# Patient Record
Sex: FEMALE | Race: WHITE | NOT HISPANIC OR LATINO | RURAL
[De-identification: names, ages, dates, MRNs, and addresses within clinical notes are randomized per-mention and may not be internally consistent; named-entity substitution may affect disease eponyms.]

---

## 2017-01-11 ENCOUNTER — OFFICE VISIT (OUTPATIENT)
Dept: RETAIL CLINIC | Facility: CLINIC | Age: 27
End: 2017-01-11

## 2017-01-11 DIAGNOSIS — Z23 NEED FOR VACCINATION: Primary | ICD-10-CM

## 2017-01-11 NOTE — PROGRESS NOTES
Presents to clinic for influenza vaccination.   Denies:   -Severe allergic reaction (e.g., anaphylaxis) after previous dose of any influenza vaccine; or to a vaccine component, including egg protein  - Moderate or severe acute illness with or without fever  - History of Guillain-Barré Syndrome within 6 weeks of previous influenza vaccination  - Tolerated well  - See scanned vaxcare document in system  - CDC vaccine information statement provided

## 2017-01-26 ENCOUNTER — OFFICE VISIT (OUTPATIENT)
Dept: RETAIL CLINIC | Facility: CLINIC | Age: 27
End: 2017-01-26

## 2017-01-26 VITALS
HEART RATE: 100 BPM | WEIGHT: 204 LBS | BODY MASS INDEX: 33.99 KG/M2 | OXYGEN SATURATION: 98 % | TEMPERATURE: 99 F | RESPIRATION RATE: 20 BRPM | HEIGHT: 65 IN

## 2017-01-26 DIAGNOSIS — J01.40 ACUTE PANSINUSITIS, RECURRENCE NOT SPECIFIED: Primary | ICD-10-CM

## 2017-01-26 DIAGNOSIS — J30.2 SEASONAL ALLERGIC RHINITIS, UNSPECIFIED ALLERGIC RHINITIS TRIGGER: ICD-10-CM

## 2017-01-26 PROCEDURE — 99213 OFFICE O/P EST LOW 20 MIN: CPT | Performed by: NURSE PRACTITIONER

## 2017-01-26 RX ORDER — FLUTICASONE PROPIONATE 50 MCG
2 SPRAY, SUSPENSION (ML) NASAL NIGHTLY
Qty: 1 EACH | Refills: 5 | Status: SHIPPED | OUTPATIENT
Start: 2017-01-26 | End: 2017-02-25

## 2017-01-26 RX ORDER — PSEUDOEPHEDRINE HCL 120 MG/1
120 TABLET, FILM COATED, EXTENDED RELEASE ORAL EVERY 12 HOURS
Qty: 20 TABLET | Refills: 0 | Status: SHIPPED | OUTPATIENT
Start: 2017-01-26 | End: 2017-02-05

## 2017-01-26 RX ORDER — SULFAMETHOXAZOLE AND TRIMETHOPRIM 800; 160 MG/1; MG/1
1 TABLET ORAL 2 TIMES DAILY
Qty: 20 TABLET | Refills: 0 | Status: SHIPPED | OUTPATIENT
Start: 2017-01-26 | End: 2017-02-05

## 2017-01-26 NOTE — MR AVS SNAPSHOT
Molly Blackwell   1/26/2017 4:15 PM   Office Visit    Dept Phone:  [phone]   Encounter #:  [id number]    Provider:  SYLVAIN MERCADO   Department:  [facility]                Your Full Care Plan              Today's Medication Changes          These changes are accurate as of: 1/26/17  4:23 PM.  If you have any questions, ask your nurse or doctor.               New Medication(s)Ordered:     fluticasone 50 MCG/ACT nasal spray   Commonly known as:  FLONASE   2 sprays into each nostril Every Night for 30 days. Administer 2 sprays in each nostril for each dose.       pseudoephedrine 120 MG 12 hr tablet   Commonly known as:  SUDAFED   Take 1 tablet by mouth Every 12 (Twelve) Hours for 10 days.       sulfamethoxazole-trimethoprim 800-160 MG per tablet   Commonly known as:  BACTRIM DS   Take 1 tablet by mouth 2 (Two) Times a Day for 10 days.         Stop taking medication(s)listed here:     predniSONE 10 MG tablet   Commonly known as:  DELTASONE                Where to Get Your Medications      These medications were sent to [facility]pharmacy #[id number] - [place] - [facility] [address] AT NEXT TO [facility] - [phone]  - [phone] [facility] [address] [facility] [address]     Phone:  [phone]     fluticasone 50 MCG/ACT nasal spray    pseudoephedrine 120 MG 12 hr tablet    sulfamethoxazole-trimethoprim 800-160 MG per tablet                  Your Updated Medication List          This list is accurate as of: 1/26/17  4:23 PM.  Always use your most recent med list.                fluticasone 50 MCG/ACT nasal spray   Commonly known as:  FLONASE   2 sprays into each nostril Every Night for 30 days. Administer 2 sprays in each nostril for each dose.       pseudoephedrine 120 MG 12 hr tablet   Commonly known as:  SUDAFED   Take 1 tablet by mouth Every 12 (Twelve) Hours for 10 days.       sulfamethoxazole-trimethoprim 800-160 MG per tablet   Commonly known as:  BACTRIM DS   Take 1  tablet by mouth 2 (Two) Times a Day for 10 days.               You Were Diagnosed With        Codes Comments    Acute pansinusitis, recurrence not specified    -  Primary ICD-10-CM: J01.40  ICD-9-CM: 461.8     Seasonal allergic rhinitis, unspecified allergic rhinitis trigger     ICD-10-CM: J30.2  ICD-9-CM: 477.9       Instructions    Sinusitis, Adult  Sinusitis is redness, soreness, and inflammation of the paranasal sinuses. Paranasal sinuses are air pockets within the bones of your face. They are located beneath your eyes, in the middle of your forehead, and above your eyes. In healthy paranasal sinuses, mucus is able to drain out, and air is able to circulate through them by way of your nose. However, when your paranasal sinuses are inflamed, mucus and air can become trapped. This can allow bacteria and other germs to grow and cause infection.  Sinusitis can develop quickly and last only a short time (acute) or continue over a long period (chronic). Sinusitis that lasts for more than 12 weeks is considered chronic.  CAUSES  Causes of sinusitis include:  · Allergies.  · Structural abnormalities, such as displacement of the cartilage that separates your nostrils (deviated septum), which can decrease the air flow through your nose and sinuses and affect sinus drainage.  · Functional abnormalities, such as when the small hairs (cilia) that line your sinuses and help remove mucus do not work properly or are not present.  SIGNS AND SYMPTOMS  Symptoms of acute and chronic sinusitis are the same. The primary symptoms are pain and pressure around the affected sinuses. Other symptoms include:  · Upper toothache.  · Earache.  · Headache.  · Bad breath.  · Decreased sense of smell and taste.  · A cough, which worsens when you are lying flat.  · Fatigue.  · Fever.  · Thick drainage from your nose, which often is green and may contain pus (purulent).  · Swelling and warmth over the affected sinuses.  DIAGNOSIS  Your health care  provider will perform a physical exam. During your exam, your health care provider may perform any of the following to help determine if you have acute sinusitis or chronic sinusitis:  · Look in your nose for signs of abnormal growths in your nostrils (nasal polyps).  · Tap over the affected sinus to check for signs of infection.  · View the inside of your sinuses using an imaging device that has a light attached (endoscope).  If your health care provider suspects that you have chronic sinusitis, one or more of the following tests may be recommended:  · Allergy tests.  · Nasal culture. A sample of mucus is taken from your nose, sent to a lab, and screened for bacteria.  · Nasal cytology. A sample of mucus is taken from your nose and examined by your health care provider to determine if your sinusitis is related to an allergy.  TREATMENT  Most cases of acute sinusitis are related to a viral infection and will resolve on their own within 10 days. Sometimes, medicines are prescribed to help relieve symptoms of both acute and chronic sinusitis. These may include pain medicines, decongestants, nasal steroid sprays, or saline sprays.  However, for sinusitis related to a bacterial infection, your health care provider will prescribe antibiotic medicines. These are medicines that will help kill the bacteria causing the infection.  Rarely, sinusitis is caused by a fungal infection. In these cases, your health care provider will prescribe antifungal medicine.  For some cases of chronic sinusitis, surgery is needed. Generally, these are cases in which sinusitis recurs more than 3 times per year, despite other treatments.  HOME CARE INSTRUCTIONS  · Drink plenty of water. Water helps thin the mucus so your sinuses can drain more easily.  · Use a humidifier.  · Inhale steam 3-4 times a day (for example, sit in the bathroom with the shower running).  · Apply a warm, moist washcloth to your face 3-4 times a day, or as directed by  your health care provider.  · Use saline nasal sprays to help moisten and clean your sinuses.  · Take medicines only as directed by your health care provider.  · If you were prescribed either an antibiotic or antifungal medicine, finish it all even if you start to feel better.  SEEK IMMEDIATE MEDICAL CARE IF:  · You have increasing pain or severe headaches.  · You have nausea, vomiting, or drowsiness.  · You have swelling around your face.  · You have vision problems.  · You have a stiff neck.  · You have difficulty breathing.     This information is not intended to replace advice given to you by your health care provider. Make sure you discuss any questions you have with your health care provider.     Document Released: 2006 Document Revised: 2016 Document Reviewed: 2013  [employer] Interactive Patient Education © [employer]       Patient Instructions History      Upcoming Appointments     Visit Type Date Time Department    OFFICE VISIT 2017  4:15 PM [facility] [place]      [facility] Signup     [facility] allows you to send messages to your doctor, view your test results, renew your prescriptions, schedule appointments, and more. To sign up, go to [employer] and click on the Sign Up Now link in the New User? box. Enter your [facility] Activation Code exactly as it appears below along with the last four digits of your Social Security Number and your Date of Birth () to complete the sign-up process. If you do not sign up before the expiration date, you must request a new code.    [facility] Activation Code: [email or web address]  Expires: 2017  4:23 PM    If you have questions, you can email [email or web address] or call [phone] to talk to our [facility] staff. Remember, [facility] is NOT to be used for urgent needs. For medical emergencies, dial 911.               Other Info from Your Visit           Allergies     Erythromycin      Penicillins        Reason for  "Visit     Sinus Problem           Vital Signs     Pulse Temperature Respirations Height Weight Last Menstrual Period    100 99 °F (37.2 °C) 20 65\" (165.1 cm) 204 lb (92.5 kg) 01/20/2017    Oxygen Saturation Body Mass Index Smoking Status             98% 33.95 kg/m2 Never Smoker         Problems and Diagnoses Noted     Acute sinus infection    -  Primary    Seasonal allergic reaction            "

## 2017-01-26 NOTE — PROGRESS NOTES
"Subjective   Molly Blackwell is a 26 y.o. female.     Sinus Problem   This is a new problem. The current episode started in the past 7 days. The problem has been gradually worsening since onset. There has been no fever. The pain is moderate. Associated symptoms include congestion, headaches (severe) and sinus pressure (severe). Pertinent negatives include no chills, coughing, ear pain, hoarse voice, shortness of breath, sneezing, sore throat or swollen glands. Treatments tried: allergy medications. The treatment provided no relief.        The following portions of the patient's history were reviewed and updated as appropriate: allergies, current medications, past family history, past medical history, past social history, past surgical history and problem list.    Review of Systems   Constitutional: Negative for appetite change, chills and fever.   HENT: Positive for congestion, postnasal drip (greenish), rhinorrhea and sinus pressure (severe). Negative for ear pain, hoarse voice, sneezing, sore throat and trouble swallowing.    Eyes: Negative.    Respiratory: Negative for cough, shortness of breath and wheezing.    Cardiovascular: Negative.    Gastrointestinal: Negative for abdominal pain, diarrhea, nausea and vomiting.   Musculoskeletal: Negative.    Skin: Negative.    Neurological: Positive for headaches (severe). Negative for dizziness.        Visit Vitals   • Pulse 100   • Temp 99 °F (37.2 °C)   • Resp 20   • Ht 65\" (165.1 cm)   • Wt 204 lb (92.5 kg)   • LMP 01/20/2017   • SpO2 98%   • BMI 33.95 kg/m2        Objective   Physical Exam   Constitutional: She is oriented to person, place, and time. Vital signs are normal. She appears well-developed and well-nourished.   HENT:   Head: Normocephalic.   Right Ear: External ear and ear canal normal. No drainage, swelling or tenderness. Tympanic membrane is bulging. Tympanic membrane is not erythematous.   Left Ear: External ear and ear canal normal. No drainage, " swelling or tenderness. Tympanic membrane is bulging. Tympanic membrane is not erythematous.   Nose: Mucosal edema and rhinorrhea present. Right sinus exhibits maxillary sinus tenderness and frontal sinus tenderness. Left sinus exhibits maxillary sinus tenderness and frontal sinus tenderness.   Mouth/Throat: Uvula is midline, oropharynx is clear and moist and mucous membranes are normal. Tonsils are 0 on the right. Tonsils are 0 on the left. No tonsillar exudate.   Eyes: Conjunctivae are normal. Pupils are equal, round, and reactive to light.   Neck: Normal range of motion.   Cardiovascular: Normal rate, regular rhythm, S1 normal, S2 normal and normal heart sounds.    Pulmonary/Chest: Effort normal and breath sounds normal. No respiratory distress. She has no wheezes.   Lymphadenopathy:        Head (right side): Tonsillar adenopathy present.        Head (left side): Tonsillar adenopathy present.     She has no cervical adenopathy.   Neurological: She is alert and oriented to person, place, and time.   Skin: Skin is warm, dry and intact. No rash noted.   Psychiatric: She has a normal mood and affect. Her speech is normal and behavior is normal. Thought content normal.   Vitals reviewed.      Assessment/Plan   Molly was seen today for sinus problem.    Diagnoses and all orders for this visit:    Acute pansinusitis, recurrence not specified  -     sulfamethoxazole-trimethoprim (BACTRIM DS) 800-160 MG per tablet; Take 1 tablet by mouth 2 (Two) Times a Day for 10 days.  -     pseudoephedrine (SUDAFED) 120 MG 12 hr tablet; Take 1 tablet by mouth Every 12 (Twelve) Hours for 10 days.    Seasonal allergic rhinitis, unspecified allergic rhinitis trigger  -     pseudoephedrine (SUDAFED) 120 MG 12 hr tablet; Take 1 tablet by mouth Every 12 (Twelve) Hours for 10 days.  -     fluticasone (FLONASE) 50 MCG/ACT nasal spray; 2 sprays into each nostril Every Night for 30 days. Administer 2 sprays in each nostril for each  dose.

## 2017-01-26 NOTE — PATIENT INSTRUCTIONS

## 2017-04-05 ENCOUNTER — OFFICE VISIT (OUTPATIENT)
Dept: RETAIL CLINIC | Facility: CLINIC | Age: 27
End: 2017-04-05

## 2017-04-05 DIAGNOSIS — H65.06 RECURRENT ACUTE SEROUS OTITIS MEDIA OF BOTH EARS: Primary | ICD-10-CM

## 2017-04-05 DIAGNOSIS — J01.40 ACUTE NON-RECURRENT PANSINUSITIS: ICD-10-CM

## 2017-04-05 PROCEDURE — 99213 OFFICE O/P EST LOW 20 MIN: CPT | Performed by: NURSE PRACTITIONER

## 2017-04-05 RX ORDER — PSEUDOEPHEDRINE HCL 120 MG/1
120 TABLET, FILM COATED, EXTENDED RELEASE ORAL EVERY 12 HOURS
Qty: 10 TABLET | Refills: 0 | Status: SHIPPED | OUTPATIENT
Start: 2017-04-05 | End: 2017-04-10

## 2017-04-05 RX ORDER — CEFDINIR 300 MG/1
300 CAPSULE ORAL 2 TIMES DAILY
Qty: 14 CAPSULE | Refills: 0 | Status: SHIPPED | OUTPATIENT
Start: 2017-04-05 | End: 2017-04-12

## 2017-04-05 NOTE — PROGRESS NOTES
Subjective   Molly Blackwell is a 26 y.o. female.     Sinus Problem   This is a new problem. The current episode started in the past 7 days. The problem has been gradually worsening since onset. The pain is moderate. Associated symptoms include congestion, coughing, ear pain and sinus pressure (worse on right). Pertinent negatives include no chills, diaphoresis, headaches, hoarse voice, neck pain, shortness of breath, sneezing, sore throat or swollen glands.        The following portions of the patient's history were reviewed and updated as appropriate: allergies, current medications, past family history, past medical history, past social history, past surgical history and problem list.    Review of Systems   Constitutional: Negative for chills and diaphoresis.   HENT: Positive for congestion, ear pain and sinus pressure (worse on right). Negative for hoarse voice, sneezing and sore throat.    Respiratory: Positive for cough. Negative for shortness of breath.    Musculoskeletal: Negative for neck pain.   Neurological: Negative for headaches.       Objective   Physical Exam   Constitutional: She appears well-developed and well-nourished.  Non-toxic appearance. She has a sickly appearance.   HENT:   Head: Normocephalic and atraumatic.   Right Ear: Ear canal normal. Tympanic membrane is bulging. Tympanic membrane is not perforated and not erythematous.   Left Ear: Ear canal normal. Tympanic membrane is bulging. Tympanic membrane is not perforated and not erythematous.   Nose: Right sinus exhibits maxillary sinus tenderness and frontal sinus tenderness. Left sinus exhibits maxillary sinus tenderness and frontal sinus tenderness.   Mouth/Throat: Mucous membranes are normal. Posterior oropharyngeal erythema (slight) present. No oropharyngeal exudate or posterior oropharyngeal edema.   Cardiovascular: Regular rhythm and normal heart sounds.    Pulmonary/Chest: Effort normal. She has no wheezes. She has no rhonchi. She  has no rales.   Lymphadenopathy:     She has no cervical adenopathy.   Skin: Skin is warm and dry.       Assessment/Plan   Diagnoses and all orders for this visit:    Recurrent acute serous otitis media of both ears    Acute non-recurrent pansinusitis    Other orders  -     cefdinir (OMNICEF) 300 MG capsule; Take 1 capsule by mouth 2 (Two) Times a Day for 7 days.  -     pseudoephedrine (SUDAFED 12 HOUR) 120 MG 12 hr tablet; Take 1 tablet by mouth Every 12 (Twelve) Hours for 5 days.

## 2017-04-05 NOTE — PATIENT INSTRUCTIONS
Sinusitis, Adult  Sinusitis is soreness and inflammation of your sinuses. Sinuses are hollow spaces in the bones around your face. Your sinuses are located:  · Around your eyes.  · In the middle of your forehead.  · Behind your nose.  · In your cheekbones.  Your sinuses and nasal passages are lined with a stringy fluid (mucus). Mucus normally drains out of your sinuses. When your nasal tissues become inflamed or swollen, the mucus can become trapped or blocked so air cannot flow through your sinuses. This allows bacteria, viruses, and funguses to grow, which leads to infection.  Sinusitis can develop quickly and last for 7-10 days (acute) or for more than 12 weeks (chronic). Sinusitis often develops after a cold.  CAUSES  This condition is caused by anything that creates swelling in the sinuses or stops mucus from draining, including:  · Allergies.  · Asthma.  · Bacterial or viral infection.  · Abnormally shaped bones between the nasal passages.  · Nasal growths that contain mucus (nasal polyps).  · Narrow sinus openings.  · Pollutants, such as chemicals or irritants in the air.  · A foreign object stuck in the nose.  · A fungal infection. This is rare.  RISK FACTORS  The following factors may make you more likely to develop this condition:  · Having allergies or asthma.  · Having had a recent cold or respiratory tract infection.  · Having structural deformities or blockages in your nose or sinuses.  · Having a weak immune system.  · Doing a lot of swimming or diving.  · Overusing nasal sprays.  · Smoking.  SYMPTOMS  The main symptoms of this condition are pain and a feeling of pressure around the affected sinuses. Other symptoms include:  · Upper toothache.  · Earache.  · Headache.  · Bad breath.  · Decreased sense of smell and taste.  · A cough that may get worse at night.  · Fatigue.  · Fever.  · Thick drainage from your nose. The drainage is often green and it may contain pus (purulent).  · Stuffy nose or  congestion.  · Postnasal drip. This is when extra mucus collects in the throat or back of the nose.  · Swelling and warmth over the affected sinuses.  · Sore throat.  · Sensitivity to light.  DIAGNOSIS  This condition is diagnosed based on symptoms, a medical history, and a physical exam. To find out if your condition is acute or chronic, your health care provider may:  · Look in your nose for signs of nasal polyps.  · Tap over the affected sinus to check for signs of infection.  · View the inside of your sinuses using an imaging device that has a light attached (endoscope).  If your health care provider suspects that you have chronic sinusitis, you may also:  · Be tested for allergies.  · Have a sample of mucus taken from your nose (nasal culture) and checked for bacteria.  · Have a mucus sample examined to see if your sinusitis is related to an allergy.  If your sinusitis does not respond to treatment and it lasts longer than 8 weeks, you may have an MRI or CT scan to check your sinuses. These scans also help to determine how severe your infection is.  In rare cases, a bone biopsy may be done to rule out more serious types of fungal sinus disease.  TREATMENT  Treatment for sinusitis depends on the cause and whether your condition is chronic or acute. If a virus is causing your sinusitis, your symptoms will go away on their own within 10 days. You may be given medicines to relieve your symptoms, including:  · Topical nasal decongestants. They shrink swollen nasal passages and let mucus drain from your sinuses.  · Antihistamines. These drugs block inflammation that is triggered by allergies. This can help to ease swelling in your nose and sinuses.  · Topical nasal corticosteroids. These are nasal sprays that ease inflammation and swelling in your nose and sinuses.  · Nasal saline washes. These rinses can help to get rid of thick mucus in your nose.  If your condition is caused by bacteria, you will be given an  antibiotic medicine. If your condition is caused by a fungus, you will be given an antifungal medicine.  Surgery may be needed to correct underlying conditions, such as narrow nasal passages. Surgery may also be needed to remove polyps.  HOME CARE INSTRUCTIONS  Medicines  · Take, use, or apply over-the-counter and prescription medicines only as told by your health care provider. These may include nasal sprays.  · If you were prescribed an antibiotic medicine, take it as told by your health care provider. Do not stop taking the antibiotic even if you start to feel better.  Hydrate and Humidify  · Drink enough water to keep your urine clear or pale yellow. Staying hydrated will help to thin your mucus.  · Use a cool mist humidifier to keep the humidity level in your home above 50%.  · Inhale steam for 10-15 minutes, 3-4 times a day or as told by your health care provider. You can do this in the bathroom while a hot shower is running.  · Limit your exposure to cool or dry air.  Rest  · Rest as much as possible.  · Sleep with your head raised (elevated).  · Make sure to get enough sleep each night.  General Instructions  · Apply a warm, moist washcloth to your face 3-4 times a day or as told by your health care provider. This will help with discomfort.  · Wash your hands often with soap and water to reduce your exposure to viruses and other germs. If soap and water are not available, use hand .  · Do not smoke. Avoid being around people who are smoking (secondhand smoke).  · Keep all follow-up visits as told by your health care provider. This is important.  SEEK MEDICAL CARE IF:  · You have a fever.  · Your symptoms get worse.  · Your symptoms do not improve within 10 days.  SEEK IMMEDIATE MEDICAL CARE IF:  · You have a severe headache.  · You have persistent vomiting.  · You have pain or swelling around your face or eyes.  · You have vision problems.  · You develop confusion.  · Your neck is stiff.  · You have  trouble breathing.     This information is not intended to replace advice given to you by your health care provider. Make sure you discuss any questions you have with your health care provider.     Document Released: 12/18/2006 Document Revised: 01/13/2017 Document Reviewed: 10/12/2016  [employer] Interactive Patient Education ©2016 [employer]    Serous Otitis Media  Serous otitis media is fluid in the middle ear space. This space contains the bones for hearing and air. Air in the middle ear space helps to transmit sound.   The air gets there through the eustachian tube. This tube goes from the back of the nose (nasopharynx) to the middle ear space. It keeps the pressure in the middle ear the same as the outside world. It also helps to drain fluid from the middle ear space.  CAUSES   Serous otitis media occurs when the eustachian tube gets blocked. Blockage can come from:  · Ear infections.  · Colds and other upper respiratory infections.  · Allergies.  · Irritants such as cigarette smoke.  · Sudden changes in air pressure (such as descending in an airplane).  · Enlarged adenoids.  · A mass in the nasopharynx.  During colds and upper respiratory infections, the middle ear space can become temporarily filled with fluid. This can happen after an ear infection also. Once the infection clears, the fluid will generally drain out of the ear through the eustachian tube. If it does not, then serous otitis media occurs.  SIGNS AND SYMPTOMS   · Hearing loss.  · A feeling of fullness in the ear, without pain.  · Young children may not show any symptoms but may show slight behavioral changes, such as agitation, ear pulling, or crying.  DIAGNOSIS   Serous otitis media is diagnosed by an ear exam. Tests may be done to check on the movement of the eardrum. Hearing exams may also be done.  TREATMENT   The fluid most often goes away without treatment. If allergy is the cause, allergy treatment may be helpful. Fluid that persists  for several months may require minor surgery. A small tube is placed in the eardrum to:  · Drain the fluid.  · Restore the air in the middle ear space.  In certain situations, antibiotic medicines are used to avoid surgery. Surgery may be done to remove enlarged adenoids (if this is the cause).  HOME CARE INSTRUCTIONS   · Keep children away from tobacco smoke.  · Keep all follow-up visits as directed by your health care provider.  SEEK MEDICAL CARE IF:   · Your hearing is not better in 3 months.  · Your hearing is worse.  · You have ear pain.  · You have drainage from the ear.  · You have dizziness.  · You have serous otitis media only in one ear or have any bleeding from your nose (epistaxis).  · You notice a lump on your neck.  MAKE SURE YOU:  · Understand these instructions.    · Will watch your condition.    · Will get help right away if you are not doing well or get worse.       This information is not intended to replace advice given to you by your health care provider. Make sure you discuss any questions you have with your health care provider.     Document Released: 03/09/2005 Document Revised: 01/08/2016 Document Reviewed: 07/15/2014  [employer] Interactive Patient Education ©2016 [employer]

## 2017-07-20 ENCOUNTER — OFFICE VISIT (OUTPATIENT)
Dept: RETAIL CLINIC | Facility: CLINIC | Age: 27
End: 2017-07-20

## 2017-07-20 VITALS
HEIGHT: 65 IN | RESPIRATION RATE: 14 BRPM | TEMPERATURE: 98.1 F | HEART RATE: 81 BPM | WEIGHT: 204 LBS | BODY MASS INDEX: 33.99 KG/M2 | OXYGEN SATURATION: 99 %

## 2017-07-20 DIAGNOSIS — J06.9 VIRAL UPPER RESPIRATORY TRACT INFECTION: Primary | ICD-10-CM

## 2017-07-20 PROCEDURE — 99213 OFFICE O/P EST LOW 20 MIN: CPT | Performed by: NURSE PRACTITIONER

## 2017-07-20 RX ORDER — DEXTROMETHORPHAN HYDROBROMIDE AND PROMETHAZINE HYDROCHLORIDE 15; 6.25 MG/5ML; MG/5ML
5 SYRUP ORAL NIGHTLY PRN
Qty: 118 ML | Refills: 0 | Status: SHIPPED | OUTPATIENT
Start: 2017-07-20 | End: 2017-07-20 | Stop reason: ALTCHOICE

## 2017-07-20 RX ORDER — PSEUDOEPHEDRINE HCL 30 MG
30 TABLET ORAL EVERY 6 HOURS PRN
Qty: 30 TABLET | Refills: 0
Start: 2017-07-20 | End: 2017-07-20 | Stop reason: ALTCHOICE

## 2017-07-20 RX ORDER — CALCIUM CARBONATE 200(500)MG
1 TABLET,CHEWABLE ORAL DAILY
COMMUNITY
End: 2017-08-31

## 2017-07-20 RX ORDER — BROMPHENIRAMINE MALEATE, PSEUDOEPHEDRINE HYDROCHLORIDE, AND DEXTROMETHORPHAN HYDROBROMIDE 2; 30; 10 MG/5ML; MG/5ML; MG/5ML
5 SYRUP ORAL 3 TIMES DAILY PRN
Qty: 118 ML | Refills: 0 | Status: SHIPPED | OUTPATIENT
Start: 2017-07-20 | End: 2017-07-27

## 2017-07-20 RX ORDER — CETIRIZINE HYDROCHLORIDE 10 MG/1
10 TABLET ORAL DAILY
COMMUNITY
End: 2019-08-18

## 2017-07-20 RX ORDER — SERTRALINE HYDROCHLORIDE 25 MG/1
25 TABLET, FILM COATED ORAL DAILY
COMMUNITY
End: 2019-08-18

## 2017-07-20 NOTE — PATIENT INSTRUCTIONS
"Upper Respiratory Infection, Adult  Most upper respiratory infections (URIs) are a viral infection of the air passages leading to the lungs. A URI affects the nose, throat, and upper air passages. The most common type of URI is nasopharyngitis and is typically referred to as \"the common cold.\"  URIs run their course and usually go away on their own. Most of the time, a URI does not require medical attention, but sometimes a bacterial infection in the upper airways can follow a viral infection. This is called a secondary infection. Sinus and middle ear infections are common types of secondary upper respiratory infections.  Bacterial pneumonia can also complicate a URI. A URI can worsen asthma and chronic obstructive pulmonary disease (COPD). Sometimes, these complications can require emergency medical care and may be life threatening.   CAUSES  Almost all URIs are caused by viruses. A virus is a type of germ and can spread from one person to another.   RISKS FACTORS  You may be at risk for a URI if:   · You smoke.    · You have chronic heart or lung disease.  · You have a weakened defense (immune) system.    · You are very young or very old.    · You have nasal allergies or asthma.  · You work in crowded or poorly ventilated areas.  · You work in health care facilities or schools.  SIGNS AND SYMPTOMS   Symptoms typically develop 2-3 days after you come in contact with a cold virus. Most viral URIs last 7-10 days. However, viral URIs from the influenza virus (flu virus) can last 14-18 days and are typically more severe. Symptoms may include:   · Runny or stuffy (congested) nose.    · Sneezing.    · Cough.    · Sore throat.    · Headache.    · Fatigue.    · Fever.    · Loss of appetite.    · Pain in your forehead, behind your eyes, and over your cheekbones (sinus pain).  · Muscle aches.    DIAGNOSIS   Your health care provider may diagnose a URI by:  · Physical exam.  · Tests to check that your symptoms are not due to " another condition such as:  ¨ Strep throat.  ¨ Sinusitis.  ¨ Pneumonia.  ¨ Asthma.  TREATMENT   A URI goes away on its own with time. It cannot be cured with medicines, but medicines may be prescribed or recommended to relieve symptoms. Medicines may help:  · Reduce your fever.  · Reduce your cough.  · Relieve nasal congestion.  HOME CARE INSTRUCTIONS   · Take medicines only as directed by your health care provider.    · Gargle warm saltwater or take cough drops to comfort your throat as directed by your health care provider.  · Use a warm mist humidifier or inhale steam from a shower to increase air moisture. This may make it easier to breathe.  · Drink enough fluid to keep your urine clear or pale yellow.    · Eat soups and other clear broths and maintain good nutrition.    · Rest as needed.    · Return to work when your temperature has returned to normal or as your health care provider advises. You may need to stay home longer to avoid infecting others. You can also use a face mask and careful hand washing to prevent spread of the virus.  · Increase the usage of your inhaler if you have asthma.    · Do not use any tobacco products, including cigarettes, chewing tobacco, or electronic cigarettes. If you need help quitting, ask your health care provider.  PREVENTION   The best way to protect yourself from getting a cold is to practice good hygiene.   · Avoid oral or hand contact with people with cold symptoms.    · Wash your hands often if contact occurs.    There is no clear evidence that vitamin C, vitamin E, echinacea, or exercise reduces the chance of developing a cold. However, it is always recommended to get plenty of rest, exercise, and practice good nutrition.   SEEK MEDICAL CARE IF:   · You are getting worse rather than better.    · Your symptoms are not controlled by medicine.    · You have chills.  · You have worsening shortness of breath.  · You have brown or red mucus.  · You have yellow or brown nasal  discharge.  · You have pain in your face, especially when you bend forward.  · You have a fever.  · You have swollen neck glands.  · You have pain while swallowing.  · You have white areas in the back of your throat.  SEEK IMMEDIATE MEDICAL CARE IF:   · You have severe or persistent:    Headache.    Ear pain.    Sinus pain.    Chest pain.  · You have chronic lung disease and any of the following:    Wheezing.    Prolonged cough.    Coughing up blood.    A change in your usual mucus.  · You have a stiff neck.  · You have changes in your:    Vision.    Hearing.    Thinking.    Mood.  MAKE SURE YOU:   · Understand these instructions.  · Will watch your condition.  · Will get help right away if you are not doing well or get worse.     This information is not intended to replace advice given to you by your health care provider. Make sure you discuss any questions you have with your health care provider.     Document Released: 06/13/2002 Document Revised: 05/03/2016 Document Reviewed: 03/25/2015  [employer] Interactive Patient Education ©2017 [employer]

## 2017-07-20 NOTE — PROGRESS NOTES
Subjective   Molly Blackwell is a 26 y.o. female.     History of Present Illness   Ms. Blackwell comes to clinic with nasal congestion, cough, sore throat and post nasal drainage with right ear pain and this started 4 days ago.  Cough is productive of sputum but is swallowing it.  No GI or  problems.  She suffers with BPV and this has made her vertigo worse, takes Meclizine prn for this.  Also has seasonal allergies, takes Zyrtec prn.     The following portions of the patient's history were reviewed and updated as appropriate: allergies, current medications, past family history, past medical history, past social history, past surgical history and problem list.    Review of Systems   Constitutional: Positive for fatigue (cough interupting sleep). Negative for activity change, appetite change and chills.   HENT: Positive for congestion, ear pain (right), postnasal drip, rhinorrhea, sinus pressure and sore throat. Negative for ear discharge, hearing loss, sneezing, trouble swallowing and voice change.    Eyes: Positive for itching. Negative for pain, discharge, redness and visual disturbance.   Respiratory: Positive for cough. Negative for shortness of breath and wheezing.    Cardiovascular: Negative for chest pain.   Gastrointestinal: Negative for abdominal pain, constipation, diarrhea, nausea and vomiting.   Genitourinary: Negative for difficulty urinating.   Skin: Negative for rash.   Neurological:        Hx vertigo that is worse with these symptoms   Psychiatric/Behavioral: Positive for sleep disturbance (with cough).       Objective   Physical Exam   Constitutional: She is oriented to person, place, and time. She appears well-developed and well-nourished. No distress.   HENT:   Head: Normocephalic and atraumatic.   Right Ear: Hearing, external ear and ear canal normal. Tympanic membrane is not injected. A middle ear effusion (clear fluid) is present.   Left Ear: Hearing, tympanic membrane, external ear and ear  canal normal.   Nose: Mucosal edema and rhinorrhea present. Right sinus exhibits no maxillary sinus tenderness and no frontal sinus tenderness. Left sinus exhibits no maxillary sinus tenderness and no frontal sinus tenderness.   Mouth/Throat: Uvula is midline, oropharynx is clear and moist and mucous membranes are normal. No oropharyngeal exudate, posterior oropharyngeal edema or posterior oropharyngeal erythema. Tonsillar abscesses: no tonsils.   Eyes: Pupils are equal, round, and reactive to light. Right eye exhibits no discharge. Left eye exhibits no discharge. No scleral icterus.   Neck: Neck supple.   Cardiovascular: Normal rate and regular rhythm.    Pulmonary/Chest: Effort normal and breath sounds normal.   Lymphadenopathy:     She has no cervical adenopathy.   Neurological: She is alert and oriented to person, place, and time.   Skin: Skin is warm and dry. She is not diaphoretic.   Psychiatric: She has a normal mood and affect. Her behavior is normal.   Nursing note and vitals reviewed.      Assessment/Plan   Molly was seen today for earache, cough and sore throat.    Diagnoses and all orders for this visit:    Viral upper respiratory tract infection  -     brompheniramine-pseudoephedrine-DM 30-2-10 MG/5ML syrup; Take 5 mL by mouth 3 (Three) Times a Day As Needed for Congestion or Cough for up to 7 days.  -     See instructions    Other orders - Promethazine DM not available at [facility], on back order.   -     Discontinue: pseudoephedrine (SUDAFED) 30 MG tablet; Take 1 tablet by mouth Every 6 (Six) Hours As Needed for Congestion for up to 7 days.  -     Discontinue: promethazine-dextromethorphan (PROMETHAZINE-DM) 6.25-15 MG/5ML syrup; Take 5 mL by mouth At Night As Needed for Cough.    Discharge instructions were reviewed including:  Medications and side effects  Reviewed [facility] Care Plan  To follow up with PCP if symptom are worse or persist  See patient instructions  Patient verbalized understanding and  agreed with plan.  TINO Wu

## 2017-08-31 ENCOUNTER — OFFICE VISIT (OUTPATIENT)
Dept: RETAIL CLINIC | Facility: CLINIC | Age: 27
End: 2017-08-31

## 2017-08-31 VITALS
TEMPERATURE: 97.6 F | BODY MASS INDEX: 34.59 KG/M2 | HEART RATE: 98 BPM | RESPIRATION RATE: 14 BRPM | OXYGEN SATURATION: 98 % | WEIGHT: 207.6 LBS | HEIGHT: 65 IN

## 2017-08-31 DIAGNOSIS — J02.9 SORE THROAT: ICD-10-CM

## 2017-08-31 DIAGNOSIS — J01.41 ACUTE RECURRENT PANSINUSITIS: Primary | ICD-10-CM

## 2017-08-31 LAB
EXPIRATION DATE: NORMAL
INTERNAL CONTROL: NORMAL
Lab: NORMAL
S PYO AG THROAT QL: NEGATIVE

## 2017-08-31 PROCEDURE — 87880 STREP A ASSAY W/OPTIC: CPT | Performed by: NURSE PRACTITIONER

## 2017-08-31 PROCEDURE — 99213 OFFICE O/P EST LOW 20 MIN: CPT | Performed by: NURSE PRACTITIONER

## 2017-08-31 RX ORDER — PREDNISONE 10 MG/1
TABLET ORAL DAILY
Qty: 21 EACH | Refills: 0 | Status: SHIPPED | OUTPATIENT
Start: 2017-08-31 | End: 2017-09-06

## 2017-08-31 RX ORDER — DOXYCYCLINE 100 MG/1
100 CAPSULE ORAL 2 TIMES DAILY
Qty: 20 CAPSULE | Refills: 0 | Status: SHIPPED | OUTPATIENT
Start: 2017-08-31 | End: 2017-09-10

## 2017-08-31 RX ORDER — PSEUDOEPHEDRINE HCL 120 MG/1
120 TABLET, FILM COATED, EXTENDED RELEASE ORAL EVERY 12 HOURS
Qty: 20 TABLET | Refills: 0 | Status: SHIPPED | OUTPATIENT
Start: 2017-08-31 | End: 2017-09-10

## 2017-08-31 RX ORDER — DOXYCYCLINE 100 MG/1
100 CAPSULE ORAL 2 TIMES DAILY
Qty: 20 CAPSULE | Refills: 0 | Status: SHIPPED | OUTPATIENT
Start: 2017-08-31 | End: 2017-08-31 | Stop reason: SDUPTHER

## 2017-08-31 RX ORDER — PSEUDOEPHEDRINE HCL 120 MG/1
120 TABLET, FILM COATED, EXTENDED RELEASE ORAL EVERY 12 HOURS
Qty: 20 TABLET | Refills: 0 | Status: SHIPPED | OUTPATIENT
Start: 2017-08-31 | End: 2017-08-31 | Stop reason: SDUPTHER

## 2017-08-31 RX ORDER — PREDNISONE 10 MG/1
TABLET ORAL DAILY
Qty: 21 EACH | Refills: 0 | Status: SHIPPED | OUTPATIENT
Start: 2017-08-31 | End: 2017-08-31 | Stop reason: SDUPTHER

## 2017-11-27 ENCOUNTER — OFFICE VISIT (OUTPATIENT)
Dept: RETAIL CLINIC | Facility: CLINIC | Age: 27
End: 2017-11-27

## 2017-11-27 VITALS — BODY MASS INDEX: 35.16 KG/M2 | HEIGHT: 65 IN | WEIGHT: 211 LBS

## 2017-11-27 DIAGNOSIS — Z32.02 PREGNANCY TEST NEGATIVE: Primary | ICD-10-CM

## 2017-11-27 LAB
B-HCG UR QL: NEGATIVE
INTERNAL NEGATIVE CONTROL: NEGATIVE
INTERNAL POSITIVE CONTROL: POSITIVE
Lab: NORMAL

## 2017-11-27 PROCEDURE — 99213 OFFICE O/P EST LOW 20 MIN: CPT | Performed by: NURSE PRACTITIONER

## 2017-11-27 PROCEDURE — 81025 URINE PREGNANCY TEST: CPT | Performed by: NURSE PRACTITIONER

## 2017-11-27 NOTE — PATIENT INSTRUCTIONS
Pregnancy Test Information  WHAT IS A PREGNANCY TEST?  A pregnancy test is used to detect the presence of human chorionic gonadotropin (hCG) in a sample of your urine or blood. hCG is a hormone produced by the cells of the placenta. The placenta is the organ that forms to nourish and support a developing baby.  This test requires a sample of either blood or urine. A pregnancy test determines whether you are pregnant or not.  HOW ARE PREGNANCY TESTS DONE?  Pregnancy tests are done using a home pregnancy test or having a blood or urine test done at your health care provider's office.   Home pregnancy tests require a urine sample.  · Most kits use a plastic testing device with a strip of paper that indicates whether there is hCG in your urine.  · Follow the test instructions very carefully.  · After you urinate on the test stick, markings will appear to let you know whether you are pregnant.  · For best results, use your first urine of the morning. That is when the concentration of hCG is highest.  Having a blood test to check for pregnancy requires a sample of blood drawn from a vein in your hand or arm. Your health care provider will send your sample to a lab for testing. Results of a pregnancy test will be positive or negative.  IS ONE TYPE OF PREGNANCY TEST BETTER THAN ANOTHER?  In some cases, a blood test will return a positive result even if a urine test was negative because blood tests are more sensitive. This means blood tests can detect hCG earlier than home pregnancy tests.   HOW ACCURATE ARE HOME PREGNANCY TESTS?   Both types of pregnancy tests are very accurate.  · A blood test is about 98% accurate.  · When you are far enough along in your pregnancy and when used correctly, home pregnancy tests are equally accurate.  CAN ANYTHING INTERFERE WITH HOME PREGNANCY TEST RESULTS?   It is possible for certain conditions to cause an inaccurate test result (false positive or false negative).  · A false positive is a  positive test result when you are not pregnant. This can happen if you:    Are taking certain medicines, including anticonvulsants or tranquilizers.    Have certain proteins in your blood.  · A false negative is a negative test result when you are pregnant. This can happen if you:    Took the test before there was enough hCG to detect. A pregnancy test will not be positive in most women until 3-4 weeks after conception.    Drank a lot of liquid before the test. Diluted urine samples can sometimes give an inaccurate result.    Take certain medicines, such as water pills (diuretics) or some antihistamines.  WHAT SHOULD I DO IF I HAVE A POSITIVE PREGNANCY TEST?  If you have a positive pregnancy test, schedule an appointment with your health care provider. You might need additional testing to confirm the pregnancy. In the meantime, begin taking a prenatal vitamin, stop smoking, stop drinking alcohol, and do not use street drugs.  Talk to your health care provider about how to take care of yourself during your pregnancy. Ask about what to expect from the care you will need throughout pregnancy (prenatal care).     This information is not intended to replace advice given to you by your health care provider. Make sure you discuss any questions you have with your health care provider.     Document Released: 12/20/2004 Document Revised: 01/08/2016 Document Reviewed: 04/14/2015  [employer] Interactive Patient Education ©2017 [employer]

## 2017-11-27 NOTE — PROGRESS NOTES
"Subjective   Molly Blackwell is a 27 y.o. female.     Possible Pregnancy   This is a new problem. The current episode started 1 to 4 weeks ago. The problem occurs constantly. The problem has been unchanged. Pertinent negatives include no abdominal pain, anorexia, fatigue, fever or vomiting. Nothing aggravates the symptoms. She has tried nothing for the symptoms.        The following portions of the patient's history were reviewed and updated as appropriate: allergies, current medications, past medical history, past social history, past surgical history and problem list.    Review of Systems   Constitutional: Negative.  Negative for fatigue and fever.   Respiratory: Negative.    Cardiovascular: Negative.    Gastrointestinal: Negative.  Negative for abdominal pain, anorexia and vomiting.   Genitourinary: Positive for menstrual problem (missed period).   Musculoskeletal: Negative.    Neurological: Negative.    Hematological: Negative.         Ht 65\" (165.1 cm)  Wt 211 lb (95.7 kg)  LMP 10/19/2017  BMI 35.11 kg/m2     Objective   Physical Exam   Constitutional: She is oriented to person, place, and time. She appears well-developed and well-nourished. No distress.   Neurological: She is alert and oriented to person, place, and time.   Skin: Skin is warm.   Psychiatric: She has a normal mood and affect. Her behavior is normal. Thought content normal.   Vitals reviewed.       Results for orders placed or performed in visit on 11/27/17   POC Pregnancy, Urine   Result Value Ref Range    HCG, Urine, QL Negative Negative    Lot Number [id number]     Internal Positive Control Positive     Internal Negative Control Negative         Assessment/Plan   Molly was seen today for possible pregnancy.    Diagnoses and all orders for this visit:    Pregnancy test negative  -     POC Pregnancy, Urine               "

## 2018-08-13 ENCOUNTER — CLINICAL SUPPORT (OUTPATIENT)
Dept: RETAIL CLINIC | Facility: CLINIC | Age: 28
End: 2018-08-13

## 2018-08-13 DIAGNOSIS — Z11.1 VISIT FOR TB SKIN TEST: Primary | ICD-10-CM

## 2018-08-13 PROCEDURE — 86580 TB INTRADERMAL TEST: CPT | Performed by: NURSE PRACTITIONER

## 2018-09-26 ENCOUNTER — OFFICE VISIT (OUTPATIENT)
Dept: RETAIL CLINIC | Facility: CLINIC | Age: 28
End: 2018-09-26

## 2018-09-26 VITALS — TEMPERATURE: 98.9 F | HEART RATE: 84 BPM | OXYGEN SATURATION: 98 % | RESPIRATION RATE: 12 BRPM

## 2018-09-26 DIAGNOSIS — J02.9 SORETHROAT: Primary | ICD-10-CM

## 2018-09-26 LAB
EXPIRATION DATE: NORMAL
INTERNAL CONTROL: NORMAL
Lab: NORMAL
S PYO AG THROAT QL: NEGATIVE

## 2018-09-26 PROCEDURE — 99213 OFFICE O/P EST LOW 20 MIN: CPT | Performed by: NURSE PRACTITIONER

## 2018-09-26 PROCEDURE — 87880 STREP A ASSAY W/OPTIC: CPT | Performed by: NURSE PRACTITIONER

## 2018-09-26 RX ORDER — AZITHROMYCIN 250 MG/1
TABLET, FILM COATED ORAL
Qty: 6 TABLET | Refills: 0 | Status: SHIPPED | OUTPATIENT
Start: 2018-09-26 | End: 2018-11-30

## 2018-09-26 RX ORDER — BROMPHENIRAMINE MALEATE, PSEUDOEPHEDRINE HYDROCHLORIDE, AND DEXTROMETHORPHAN HYDROBROMIDE 2; 30; 10 MG/5ML; MG/5ML; MG/5ML
5 SYRUP ORAL 4 TIMES DAILY PRN
Qty: 120 ML | Refills: 0 | Status: SHIPPED | OUTPATIENT
Start: 2018-09-26 | End: 2018-10-01

## 2018-09-26 RX ORDER — IBUPROFEN 800 MG/1
800 TABLET ORAL EVERY 6 HOURS PRN
Qty: 30 TABLET | Refills: 0 | Status: SHIPPED | OUTPATIENT
Start: 2018-09-26 | End: 2018-10-06

## 2018-09-26 NOTE — PATIENT INSTRUCTIONS
Viral Respiratory Infection  A respiratory infection is an illness that affects part of the respiratory system, such as the lungs, nose, or throat. Most respiratory infections are caused by either viruses or bacteria. A respiratory infection that is caused by a virus is called a viral respiratory infection.  Common types of viral respiratory infections include:  · A cold.  · The flu (influenza).  · A respiratory syncytial virus (RSV) infection.    How do I know if I have a viral respiratory infection?  Most viral respiratory infections cause:  · A stuffy or runny nose.  · Yellow or green nasal discharge.  · A cough.  · Sneezing.  · Fatigue.  · Achy muscles.  · A sore throat.  · Sweating or chills.  · A fever.  · A headache.    How are viral respiratory infections treated?  If influenza is diagnosed early, it may be treated with an antiviral medicine that shortens the length of time a person has symptoms. Symptoms of viral respiratory infections may be treated with over-the-counter and prescription medicines, such as:  · Expectorants. These make it easier to cough up mucus.  · Decongestant nasal sprays.    Health care providers do not prescribe antibiotic medicines for viral infections. This is because antibiotics are designed to kill bacteria. They have no effect on viruses.  How do I know if I should stay home from work or school?  To avoid exposing others to your respiratory infection, stay home if you have:  · A fever.  · A persistent cough.  · A sore throat.  · A runny nose.  · Sneezing.  · Muscles aches.  · Headaches.  · Fatigue.  · Weakness.  · Chills.  · Sweating.  · Nausea.    Follow these instructions at home:  · Rest as much as possible.  · Take over-the-counter and prescription medicines only as told by your health care provider.  · Drink enough fluid to keep your urine clear or pale yellow. This helps prevent dehydration and helps loosen up mucus.  · Gargle with a salt-water mixture 3-4 times per day or  as needed. To make a salt-water mixture, completely dissolve ½-1 tsp of salt in 1 cup of warm water.  · Use nose drops made from salt water to ease congestion and soften raw skin around your nose.  · Do not drink alcohol.  · Do not use tobacco products, including cigarettes, chewing tobacco, and e-cigarettes. If you need help quitting, ask your health care provider.  Contact a health care provider if:  · Your symptoms last for 10 days or longer.  · Your symptoms get worse over time.  · You have a fever.  · You have severe sinus pain in your face or forehead.  · The glands in your jaw or neck become very swollen.  Get help right away if:  · You feel pain or pressure in your chest.  · You have shortness of breath.  · You faint or feel like you will faint.  · You have severe and persistent vomiting.  · You feel confused or disoriented.  This information is not intended to replace advice given to you by your health care provider. Make sure you discuss any questions you have with your health care provider.  Document Released: 09/27/2006 Document Revised: 05/25/2017 Document Reviewed: 05/25/2016  [employer] Interactive Patient Education © 2018 [employer]

## 2018-09-26 NOTE — PROGRESS NOTES
Subjective   Molly Blackwell is a 28 y.o. female.   Pulse 84   Temp 98.9 °F (37.2 °C) (Oral)   Resp 12   LMP 09/19/2018   SpO2 98%       URI    This is a new problem. Episode onset: past 3-4 days. The problem has been gradually worsening. Maximum temperature: subjective. Associated symptoms include congestion, coughing, headaches, a plugged ear sensation, rhinorrhea and a sore throat. Pertinent negatives include no abdominal pain, chest pain, diarrhea, dysuria, ear pain, joint pain, joint swelling, nausea, neck pain, rash, sinus pain, sneezing, swollen glands, vomiting or wheezing.        The following portions of the patient's history were reviewed and updated as appropriate: allergies, current medications, past family history, past medical history, past social history, past surgical history and problem list.    Review of Systems   HENT: Positive for congestion, rhinorrhea and sore throat. Negative for ear pain, sinus pain and sneezing.    Respiratory: Positive for cough. Negative for wheezing.    Cardiovascular: Negative for chest pain.   Gastrointestinal: Negative for abdominal pain, diarrhea, nausea and vomiting.   Genitourinary: Negative for dysuria.   Musculoskeletal: Negative for joint pain and neck pain.   Skin: Negative for rash.   Neurological: Positive for headaches.       Objective   Physical Exam   Constitutional: She appears well-developed and well-nourished.  Non-toxic appearance. She appears ill (mild).   HENT:   Head: Normocephalic and atraumatic.   Right Ear: Tympanic membrane and ear canal normal.   Left Ear: Tympanic membrane and ear canal normal.   Nose: Mucosal edema present. Right sinus exhibits no maxillary sinus tenderness and no frontal sinus tenderness. Left sinus exhibits no maxillary sinus tenderness and no frontal sinus tenderness.   Mouth/Throat: Uvula is midline. Posterior oropharyngeal erythema present. Tonsils are 0 on the right. Tonsils are 0 on the left. No tonsillar  exudate.   Cardiovascular: Regular rhythm and normal heart sounds.    Pulmonary/Chest: Effort normal. She has no wheezes. She has no rhonchi. She has no rales.   Lymphadenopathy:     She has no cervical adenopathy.   Skin: Skin is warm and dry.       Assessment/Plan   Molly was seen today for nasal congestion, cough and sore throat.    Diagnoses and all orders for this visit:    Sorethroat  -     POC Rapid Strep A    Other orders  -     ibuprofen (ADVIL,MOTRIN) 800 MG tablet; Take 1 tablet by mouth Every 6 (Six) Hours As Needed for Mild Pain  or Moderate Pain  for up to 10 days.  -     brompheniramine-pseudoephedrine-DM 30-2-10 MG/5ML syrup; Take 5 mL by mouth 4 (Four) Times a Day As Needed for Cough for up to 5 days.  -     azithromycin (ZITHROMAX Z-ROMAINE) 250 MG tablet; Take 2 tablets the first day, then 1 tablet daily for 4 days.      Pt advised this is likely viral in nature. Start th ibuprofen and syrup. Only start the antibiotic if symptoms last longer than 7-10 days.     Results for orders placed or performed in visit on 11/27/17   POC Pregnancy, Urine   Result Value Ref Range    HCG, Urine, QL Negative Negative    Lot Number [id number]     Internal Positive Control Positive     Internal Negative Control Negative

## 2018-11-30 ENCOUNTER — OFFICE VISIT (OUTPATIENT)
Dept: RETAIL CLINIC | Facility: CLINIC | Age: 28
End: 2018-11-30

## 2018-11-30 VITALS
BODY MASS INDEX: 35.99 KG/M2 | TEMPERATURE: 98.8 F | SYSTOLIC BLOOD PRESSURE: 126 MMHG | RESPIRATION RATE: 15 BRPM | HEIGHT: 65 IN | WEIGHT: 216 LBS | HEART RATE: 108 BPM | DIASTOLIC BLOOD PRESSURE: 84 MMHG | OXYGEN SATURATION: 98 %

## 2018-11-30 DIAGNOSIS — R11.0 NAUSEA: ICD-10-CM

## 2018-11-30 DIAGNOSIS — H66.93 ACUTE OTITIS MEDIA, BILATERAL: Primary | ICD-10-CM

## 2018-11-30 DIAGNOSIS — R68.89 FLU-LIKE SYMPTOMS: ICD-10-CM

## 2018-11-30 LAB
EXPIRATION DATE: NORMAL
FLUAV AG NPH QL: NEGATIVE
FLUBV AG NPH QL: NEGATIVE
INTERNAL CONTROL: NORMAL
Lab: NORMAL

## 2018-11-30 PROCEDURE — 99213 OFFICE O/P EST LOW 20 MIN: CPT | Performed by: NURSE PRACTITIONER

## 2018-11-30 PROCEDURE — 87804 INFLUENZA ASSAY W/OPTIC: CPT | Performed by: NURSE PRACTITIONER

## 2018-11-30 RX ORDER — PROMETHAZINE HYDROCHLORIDE 25 MG/1
TABLET ORAL
Qty: 8 TABLET | Refills: 0 | Status: SHIPPED | OUTPATIENT
Start: 2018-11-30 | End: 2018-12-27

## 2018-11-30 RX ORDER — ONDANSETRON 4 MG/1
4 TABLET, ORALLY DISINTEGRATING ORAL EVERY 6 HOURS PRN
Qty: 5 TABLET | Refills: 0 | Status: SHIPPED | OUTPATIENT
Start: 2018-11-30 | End: 2019-08-18

## 2018-11-30 RX ORDER — FLUTICASONE PROPIONATE 50 MCG
2 SPRAY, SUSPENSION (ML) NASAL DAILY
Qty: 1 BOTTLE | Refills: 0 | Status: SHIPPED | OUTPATIENT
Start: 2018-11-30 | End: 2019-08-18 | Stop reason: SDUPTHER

## 2018-11-30 RX ORDER — CEFPROZIL 500 MG/1
500 TABLET, FILM COATED ORAL DAILY
Qty: 10 TABLET | Refills: 0 | Status: SHIPPED | OUTPATIENT
Start: 2018-11-30 | End: 2018-12-10

## 2018-11-30 RX ORDER — MECLIZINE HCL 25MG 25 MG/1
25 TABLET, CHEWABLE ORAL 3 TIMES DAILY PRN
COMMUNITY
End: 2020-01-09

## 2018-11-30 NOTE — PATIENT INSTRUCTIONS
Otitis Media, Adult  Otitis media is redness, soreness, and puffiness (swelling) in the space just behind your eardrum (middle ear). It may be caused by allergies or infection. It often happens along with a cold.  Follow these instructions at home:  · Take your medicine as told. Finish it even if you start to feel better.  · Only take over-the-counter or prescription medicines for pain, discomfort, or fever as told by your doctor.  · Follow up with your doctor as told.  Contact a doctor if:  · You have otitis media only in one ear, or bleeding from your nose, or both.  · You notice a lump on your neck.  · You are not getting better in 3-5 days.  · You feel worse instead of better.  Get help right away if:  · You have pain that is not helped with medicine.  · You have puffiness, redness, or pain around your ear.  · You get a stiff neck.  · You cannot move part of your face (paralysis).  · You notice that the bone behind your ear hurts when you touch it.  This information is not intended to replace advice given to you by your health care provider. Make sure you discuss any questions you have with your health care provider.  Document Released: 06/05/2009 Document Revised: 05/25/2017 Document Reviewed: 07/15/2014  [employer] Interactive Patient Education © 2017 [employer]          Keep scheduled appt with ENT  Drink plenty of fluids   See primary care provider in 2 weeks, sooner if not better within 3 days    Nausea, Adult  Feeling sick to your stomach (nausea) means that your stomach is upset or you feel like you have to throw up (vomit). Feeling sick to your stomach is usually not serious, but it may be an early sign of a more serious medical problem. As you feel sicker to your stomach, it can lead to throwing up (vomiting). If you throw up, or if you are not able to drink enough fluids, there is a risk of dehydration. Dehydration can make you feel tired and thirsty, have a dry mouth, and pee (urinate) less often.  Older adults and people who have other diseases or a weak defense (immune) system have a higher risk of dehydration.  The main goal of treating this condition is to:  · Limit how often you feel sick to your stomach.  · Prevent throwing up and dehydration.    Follow these instructions at home:  Follow instructions from your doctor about how to care for yourself at home.  Eating and drinking  Follow these recommendations as told by your doctor:  · Take an oral rehydration solution (ORS). This is a drink that is sold at pharmacies and stores.  · Drink clear fluids in small amounts as you are able, such as:  ? Water.  ? Ice chips.  ? Fruit juice that has water added (diluted fruit juice).  ? Low-calorie sports drinks.  · Eat bland, easy to digest foods in small amounts as you are able, such as:  ? Bananas.  ? Applesauce.  ? Rice.  ? Lean meats.  ? Toast.  ? Crackers.  · Avoid drinking fluids that contain a lot of sugar or caffeine.  · Avoid alcohol.  · Avoid spicy or fatty foods.    General instructions  · Drink enough fluid to keep your pee (urine) clear or pale yellow.  · Wash your hands often. If you cannot use soap and water, use hand .  · Make sure that all people in your household wash their hands well and often.  · Rest at home while you get better.  · Take over-the-counter and prescription medicines only as told by your doctor.  · Breathe slowly and deeply when you feel sick to your stomach.  · Watch your condition for any changes.  · Keep all follow-up visits as told by your doctor. This is important.  Contact a doctor if:  · You have a headache.  · You have new symptoms.  · You feel sicker to your stomach.  · You have a fever.  · You feel light-headed or dizzy.  · You throw up.  · You are not able to keep fluids down.  Get help right away if:  · You have pain in your chest, neck, arm, or jaw.  · You feel very weak or you pass out (faint).  · You have throw up that is bright red or looks like coffee  grounds.  · You have bloody or black poop (stools), or poop that looks like tar.  · You have a very bad headache, a stiff neck, or both.  · You have very bad pain, cramping, or bloating in your belly.  · You have a rash.  · You have trouble breathing or you are breathing very quickly.  · Your heart is beating very quickly.  · Your skin feels cold and clammy.  · You feel confused.  · You have pain while peeing.  · You have signs of dehydration, such as:  ? Dark pee, or very little or no pee.  ? Cracked lips.  ? Dry mouth.  ? Sunken eyes.  ? Sleepiness.  ? Weakness.  These symptoms may be an emergency. Do not wait to see if the symptoms will go away. Get medical help right away. Call your local emergency services (911 in the [place] Do not drive yourself to the hospital.  This information is not intended to replace advice given to you by your health care provider. Make sure you discuss any questions you have with your health care provider.  Document Released: 12/06/2012 Document Revised: 05/25/2017 Document Reviewed: 08/23/2016  [employer] Interactive Patient Education © 2018 [employer]

## 2018-11-30 NOTE — PROGRESS NOTES
Subjective   URI and Earache    Molly Blackwell is a 28 y.o. female who presents with sore throat, headache, body aches, nausea and bilateral earache. States has been unable to sleep due to feeling ill. Patient has a history of chronic otitis media with vertigo and has been referred to ENT.  She works at  and has been exposed to multiple illness.     URI    This is a new problem. Episode onset: 3 days. The problem has been gradually worsening. Maximum temperature: subjective. Associated symptoms include abdominal pain (generlzied), diarrhea (1 episode loose stool), ear pain, headaches, nausea, a plugged ear sensation, rhinorrhea, sneezing and a sore throat. Pertinent negatives include no chest pain, coughing, dysuria, neck pain, rash, sinus pain, vomiting or wheezing. She has tried acetaminophen and NSAIDs for the symptoms.   Earache    There is pain in both ears. This is a recurrent problem. The current episode started yesterday. The problem occurs every few minutes. The problem has been gradually worsening. Maximum temperature: subjective. The pain is moderate. Associated symptoms include abdominal pain (generlzied), diarrhea (1 episode loose stool), headaches, rhinorrhea and a sore throat. Pertinent negatives include no coughing, ear discharge, neck pain, rash or vomiting. She has tried NSAIDs and acetaminophen for the symptoms. The treatment provided mild relief. Her past medical history is significant for a chronic ear infection.   Nausea   This is a new problem. The current episode started yesterday. The problem occurs constantly. The problem has been waxing and waning. Associated symptoms include abdominal pain (generlzied), chills, fatigue, a fever (subjective), headaches, myalgias, nausea, a sore throat and vertigo (chronic). Pertinent negatives include no chest pain, coughing, neck pain, rash, visual change or vomiting. Nothing aggravates the symptoms.        History Obtained from: Patient    Past  Medical History:   Diagnosis Date   • Acid reflux    • ADHD (attention deficit hyperactivity disorder)    • Allergic    • Bipolar disorder (CMS/[facility])    • H. pylori infection    • Vertigo, benign paroxysmal      Past Surgical History:   Procedure Laterality Date   • ADENOIDECTOMY     • TONSILLECTOMY     • WISDOM TOOTH EXTRACTION       Social History     Socioeconomic History   • Marital status: Single     Spouse name: Not on file   • Number of children: Not on file   • Years of education: Not on file   • Highest education level: Not on file   Social Needs   • Financial resource strain: Not on file   • Food insecurity - worry: Not on file   • Food insecurity - inability: Not on file   • Transportation needs - medical: Not on file   • Transportation needs - non-medical: Not on file   Occupational History   • Not on file   Tobacco Use   • Smoking status: Never Smoker   • Smokeless tobacco: Never Used   Substance and Sexual Activity   • Alcohol use: No   • Drug use: No   • Sexual activity: Yes     Partners: Male     Comment: no birth control, same partner for years   Other Topics Concern   • Not on file   Social History Narrative   • Not on file     Family History   Problem Relation Age of Onset   • Cancer Mother 40        lung cancer   • No Known Problems Father    • Diabetes Sister      Allergies   Allergen Reactions   • Erythromycin Rash   • Penicillins GI Intolerance     Current Outpatient Medications   Medication Sig Dispense Refill   • meclizine 25 MG chewable tablet chewable tablet Chew 25 mg 3 (Three) Times a Day As Needed.     • cefprozil (CEFZIL) 500 MG tablet Take 1 tablet by mouth Daily for 10 days. 10 tablet 0   • cetirizine (zyrTEC) 10 MG tablet Take 10 mg by mouth Daily.     • fluticasone (FLONASE) 50 MCG/ACT nasal spray 2 sprays into the nostril(s) as directed by provider Daily. 1 bottle 0   • ondansetron ODT (ZOFRAN ODT) 4 MG disintegrating tablet Take 1 tablet by mouth Every 6 (Six) Hours As Needed for  "Nausea or Vomiting. 5 tablet 0   • promethazine (PHENERGAN) 25 MG tablet Take 1/2 - 1 tab every 6 hrs prn nausea/vomiting 8 tablet 0   • sertraline (ZOLOFT) 25 MG tablet Take 25 mg by mouth Daily.       No current facility-administered medications for this visit.         The following portions of the patient's history were reviewed and updated as appropriate: allergies, current medications, past family history, past medical history, past social history and past surgical history.    Review of Systems   Constitutional: Positive for chills, fatigue and fever (subjective).   HENT: Positive for ear pain, rhinorrhea, sneezing, sore throat and tinnitus (not now, intermittent \"ringing\"). Negative for ear discharge, postnasal drip, sinus pressure, sinus pain, trouble swallowing and voice change.    Eyes: Negative.    Respiratory: Negative for cough, chest tightness, shortness of breath and wheezing.    Cardiovascular: Negative for chest pain and palpitations.   Gastrointestinal: Positive for abdominal pain (generlzied), diarrhea (1 episode loose stool) and nausea. Negative for vomiting.   Genitourinary: Negative for difficulty urinating, dysuria and flank pain.   Musculoskeletal: Positive for myalgias. Negative for neck pain and neck stiffness.   Skin: Negative for rash.   Allergic/Immunologic: Negative for immunocompromised state.   Neurological: Positive for dizziness (intermittent, chonic), vertigo (chronic) and headaches. Negative for light-headedness.   Hematological: Negative for adenopathy. Does not bruise/bleed easily.   Psychiatric/Behavioral: Negative for agitation, self-injury and sleep disturbance. The patient is not nervous/anxious.        Objective     VITAL SIGNS:   Vitals:    11/30/18 1139   BP: 126/84   Pulse: 108   Resp: 15   Temp: 98.8 °F (37.1 °C)   SpO2: 98%   Weight: 98 kg (216 lb)   Height: 165.1 cm (65\")   Body mass index is 35.94 kg/m².    Physical Exam   Constitutional: She is cooperative. She " appears ill (mildly-ill appearing).   HENT:   Head: Normocephalic and atraumatic.   Right Ear: External ear and ear canal normal. Tympanic membrane is erythematous and bulging. Tympanic membrane is not perforated. A middle ear effusion is present.   Left Ear: External ear and ear canal normal. Tympanic membrane is erythematous and retracted. Tympanic membrane is not perforated. A middle ear effusion is present.   Nose: Rhinorrhea (with brisk erythema of nasal mucosa) present. Right sinus exhibits no maxillary sinus tenderness and no frontal sinus tenderness. Left sinus exhibits no maxillary sinus tenderness and no frontal sinus tenderness.   Mouth/Throat: Uvula is midline and oropharynx is clear and moist.   Tonsils are surgically absent     Eyes: EOM and lids are normal. Pupils are equal, round, and reactive to light. No scleral icterus.   Neck: Normal range of motion and phonation normal. Neck supple. No tracheal deviation present.   Cardiovascular: Regular rhythm. Tachycardia present.   No murmur heard.  Pulmonary/Chest: Effort normal and breath sounds normal.   Abdominal: Soft. Bowel sounds are normal. There is no hepatosplenomegaly. There is tenderness in the left upper quadrant. There is no CVA tenderness.   Musculoskeletal: She exhibits no edema or deformity.   Lymphadenopathy:     Cervical adenopathy: tenderness to right cervical neck with no appreciable adenopathy.        Right: No supraclavicular adenopathy present.        Left: No supraclavicular adenopathy present.   Neurological: She is alert. She is not disoriented. Coordination and gait normal.   Skin: Skin is warm and dry. Capillary refill takes less than 2 seconds. No pallor. Nails show no clubbing.   Psychiatric: She has a normal mood and affect. Her behavior is normal. She is attentive.   Vitals reviewed.      LABS:   Results for orders placed or performed in visit on 11/30/18   POC Influenza A / B   Result Value Ref Range    Rapid Influenza A Ag  NEGATIVE Negative    Rapid Influenza B Ag NEGATIVE Negative    Internal Control Passed Passed    Lot Number [id number]     Expiration Date 12,312,019        CLINICAL QUALITY MEASURES:  Tobacco Screening & Intervention Screened & identified as tobacco non-user. Former smoker   WEIGHT SCREENING/BMI  Not eligible, overweight & managed by other physician     Assessment/Plan     Molly was seen today for uri and earache.    Diagnoses and all orders for this visit:    Acute otitis media, bilateral  -     POC Influenza A / B    Nausea    Flu-like symptoms    Other orders  -     fluticasone (FLONASE) 50 MCG/ACT nasal spray; 2 sprays into the nostril(s) as directed by provider Daily.  -     cefprozil (CEFZIL) 500 MG tablet; Take 1 tablet by mouth Daily for 10 days.  -     ondansetron ODT (ZOFRAN ODT) 4 MG disintegrating tablet; Take 1 tablet by mouth Every 6 (Six) Hours As Needed for Nausea or Vomiting.  -     promethazine (PHENERGAN) 25 MG tablet; Take 1/2 - 1 tab every 6 hrs prn nausea/vomiting        PLAN: Patient has acute on chronic otitis media, will treat with antibiotics due to her symptoms.   Patient should follow up with primary care provider in 2 weeks. See sooner if symptoms fail to improve, worsen or for the development of new symptoms that need attention.    The patient voiced understanding and agreement to the patient treatment plan and instructions     TINO Kimbrough

## 2018-12-27 ENCOUNTER — OFFICE VISIT (OUTPATIENT)
Dept: RETAIL CLINIC | Facility: CLINIC | Age: 28
End: 2018-12-27

## 2018-12-27 VITALS
HEIGHT: 65 IN | WEIGHT: 211 LBS | BODY MASS INDEX: 35.16 KG/M2 | HEART RATE: 78 BPM | OXYGEN SATURATION: 98 % | TEMPERATURE: 98.5 F | RESPIRATION RATE: 15 BRPM | SYSTOLIC BLOOD PRESSURE: 98 MMHG | DIASTOLIC BLOOD PRESSURE: 75 MMHG

## 2018-12-27 DIAGNOSIS — R09.81 SINUS CONGESTION: Primary | ICD-10-CM

## 2018-12-27 DIAGNOSIS — R05.9 COUGHING: ICD-10-CM

## 2018-12-27 DIAGNOSIS — J02.9 SORE THROAT: ICD-10-CM

## 2018-12-27 PROCEDURE — 99213 OFFICE O/P EST LOW 20 MIN: CPT | Performed by: NURSE PRACTITIONER

## 2018-12-27 RX ORDER — METHOCARBAMOL 500 MG/1
TABLET, FILM COATED ORAL
COMMUNITY
Start: 2018-12-14

## 2018-12-27 RX ORDER — PREDNISONE 10 MG/1
TABLET ORAL DAILY
Qty: 21 EACH | Refills: 0 | Status: SHIPPED | OUTPATIENT
Start: 2018-12-27 | End: 2019-01-02

## 2018-12-27 RX ORDER — PSEUDOEPHEDRINE HCL 120 MG/1
120 TABLET, FILM COATED, EXTENDED RELEASE ORAL EVERY 12 HOURS
Qty: 20 TABLET | Refills: 0 | Status: SHIPPED | OUTPATIENT
Start: 2018-12-27 | End: 2019-01-06

## 2018-12-27 RX ORDER — CLINDAMYCIN PHOSPHATE 10 MG/G
GEL TOPICAL
COMMUNITY
Start: 2018-10-17 | End: 2020-01-09

## 2018-12-27 RX ORDER — DEXTROMETHORPHAN HYDROBROMIDE AND PROMETHAZINE HYDROCHLORIDE 15; 6.25 MG/5ML; MG/5ML
5 SYRUP ORAL 4 TIMES DAILY PRN
Qty: 240 ML | Refills: 0 | Status: SHIPPED | OUTPATIENT
Start: 2018-12-27 | End: 2019-01-06

## 2018-12-27 RX ORDER — IBUPROFEN 800 MG/1
TABLET ORAL
COMMUNITY
Start: 2018-12-10 | End: 2019-08-18

## 2018-12-27 NOTE — PROGRESS NOTES
Subjective   Molly Blackwell is a 28 y.o. female.     Sinus Problem   This is a recurrent problem. The current episode started in the past 7 days. The problem has been gradually worsening since onset. There has been no fever. She is experiencing no pain. Associated symptoms include congestion, coughing (severe, persistent), ear pain (left), headaches, a hoarse voice and sinus pressure. Pertinent negatives include no chills, neck pain, shortness of breath, sneezing, sore throat or swollen glands. Treatments tried: allergy medications. The treatment provided no relief.   Cough   This is a new problem. Episode onset: 2 days. The problem has been gradually worsening. The problem occurs every few minutes. The cough is non-productive. Associated symptoms include ear congestion, ear pain (left), headaches, nasal congestion, postnasal drip and rhinorrhea. Pertinent negatives include no chest pain, chills, fever, rash, sore throat, shortness of breath, weight loss or wheezing. The symptoms are aggravated by cold air. She has tried OTC cough suppressant for the symptoms. The treatment provided no relief. There is no history of asthma, bronchitis or pneumonia.        The following portions of the patient's history were reviewed and updated as appropriate: allergies, current medications, past medical history, past social history, past surgical history and problem list.    Review of Systems   Constitutional: Negative for appetite change, chills, fever and weight loss.   HENT: Positive for congestion, ear pain (left), hoarse voice, postnasal drip, rhinorrhea and sinus pressure. Negative for sinus pain, sneezing, sore throat and trouble swallowing.    Eyes: Negative.    Respiratory: Positive for cough (severe, persistent) and chest tightness. Negative for shortness of breath and wheezing.    Cardiovascular: Negative.  Negative for chest pain.   Gastrointestinal: Negative for abdominal pain, diarrhea, nausea and vomiting.  "  Musculoskeletal: Negative.  Negative for neck pain.   Skin: Negative.  Negative for rash.   Neurological: Positive for headaches. Negative for dizziness.   Hematological: Negative for adenopathy.        BP 98/75   Pulse 78   Temp 98.5 °F (36.9 °C)   Resp 15   Ht 165.1 cm (65\")   Wt 95.7 kg (211 lb)   LMP 12/05/2018   SpO2 98%   BMI 35.11 kg/m²      Objective   Physical Exam   Constitutional: She is oriented to person, place, and time. Vital signs are normal. She appears well-developed and well-nourished. No distress.   HENT:   Head: Normocephalic.   Right Ear: External ear and ear canal normal. No drainage, swelling or tenderness. Tympanic membrane is bulging. Tympanic membrane is not erythematous.   Left Ear: External ear and ear canal normal. No drainage, swelling or tenderness. Tympanic membrane is bulging (left > right). Tympanic membrane is not erythematous.   Nose: Mucosal edema and rhinorrhea present. Right sinus exhibits no maxillary sinus tenderness and no frontal sinus tenderness. Left sinus exhibits no maxillary sinus tenderness and no frontal sinus tenderness.   Mouth/Throat: Uvula is midline, oropharynx is clear and moist and mucous membranes are normal. Tonsils are 0 on the right. Tonsils are 0 on the left. No tonsillar exudate.   Neck: Normal range of motion. Neck supple.   Cardiovascular: Normal rate, regular rhythm, S1 normal, S2 normal and normal heart sounds.   Pulmonary/Chest: Effort normal and breath sounds normal. No stridor. No respiratory distress. She has no decreased breath sounds. She has no wheezes. She has no rhonchi. She has no rales.   Abdominal: Soft. Bowel sounds are normal. She exhibits no distension. There is no tenderness. There is no rebound and no guarding.   Lymphadenopathy:        Head (right side): No tonsillar adenopathy present.        Head (left side): No tonsillar adenopathy present.     She has no cervical adenopathy.   Neurological: She is alert and oriented " to person, place, and time.   Skin: Skin is warm and dry. No rash noted. She is not diaphoretic.   Psychiatric: She has a normal mood and affect. Her speech is normal and behavior is normal. Thought content normal.   Vitals reviewed.      Assessment/Plan   Molly was seen today for sinus problem and cough.    Diagnoses and all orders for this visit:    Sinus congestion  -     pseudoephedrine (SUDAFED) 120 MG 12 hr tablet; Take 1 tablet by mouth Every 12 (Twelve) Hours for 10 days.    Coughing  -     PredniSONE (DELTASONE) 10 MG (21) tablet pack; Take  by mouth Daily for 6 days. Use as directed on package  -     promethazine-dextromethorphan (PROMETHAZINE-DM) 6.25-15 MG/5ML syrup; Take 5 mL by mouth 4 (Four) Times a Day As Needed for Cough for up to 10 days.    Sore throat  -     Cancel: POC Rapid Strep A  -     PredniSONE (DELTASONE) 10 MG (21) tablet pack; Take  by mouth Daily for 6 days. Use as directed on package

## 2019-08-18 ENCOUNTER — OFFICE VISIT (OUTPATIENT)
Dept: RETAIL CLINIC | Facility: CLINIC | Age: 29
End: 2019-08-18

## 2019-08-18 VITALS
HEIGHT: 64 IN | WEIGHT: 217 LBS | TEMPERATURE: 97.2 F | RESPIRATION RATE: 15 BRPM | BODY MASS INDEX: 37.05 KG/M2 | HEART RATE: 88 BPM | OXYGEN SATURATION: 98 %

## 2019-08-18 DIAGNOSIS — J01.01 ACUTE RECURRENT MAXILLARY SINUSITIS: Primary | ICD-10-CM

## 2019-08-18 PROCEDURE — 99213 OFFICE O/P EST LOW 20 MIN: CPT | Performed by: NURSE PRACTITIONER

## 2019-08-18 RX ORDER — FLUTICASONE PROPIONATE 50 MCG
2 SPRAY, SUSPENSION (ML) NASAL DAILY
Qty: 1 BOTTLE | Refills: 2 | Status: SHIPPED | OUTPATIENT
Start: 2019-08-18 | End: 2019-09-17

## 2019-08-18 RX ORDER — SERTRALINE HYDROCHLORIDE 100 MG/1
TABLET, FILM COATED ORAL
COMMUNITY
Start: 2019-08-06

## 2019-08-18 RX ORDER — BROMPHENIRAMINE MALEATE, PSEUDOEPHEDRINE HYDROCHLORIDE, AND DEXTROMETHORPHAN HYDROBROMIDE 2; 30; 10 MG/5ML; MG/5ML; MG/5ML
5 SYRUP ORAL 4 TIMES DAILY PRN
Qty: 120 ML | Refills: 0 | Status: SHIPPED | OUTPATIENT
Start: 2019-08-18 | End: 2019-08-23

## 2019-08-18 RX ORDER — AZITHROMYCIN 250 MG/1
TABLET, FILM COATED ORAL
Qty: 6 TABLET | Refills: 0 | Status: SHIPPED | OUTPATIENT
Start: 2019-08-18 | End: 2020-01-09

## 2019-08-18 NOTE — PATIENT INSTRUCTIONS

## 2019-08-18 NOTE — PROGRESS NOTES
"Prince Blackwell is a 28 y.o. female.   Pulse 88   Temp 97.2 °F (36.2 °C)   Resp 15   Ht 162.6 cm (64\")   Wt 98.4 kg (217 lb)   LMP 08/01/2019   SpO2 98%   BMI 37.25 kg/m²   Past Medical History:   Diagnosis Date   • Acid reflux    • ADHD (attention deficit hyperactivity disorder)    • Allergic    • Bipolar disorder (CMS/HCC)    • H. pylori infection    • Vertigo, benign paroxysmal      Allergies   Allergen Reactions   • Erythromycin Rash   • Penicillins GI Intolerance         Sinus Problem   This is a new problem. The current episode started in the past 7 days. The problem has been gradually worsening since onset. There has been no fever. The pain is moderate. Associated symptoms include congestion, coughing, headaches, a hoarse voice and sinus pressure. Pertinent negatives include no chills, diaphoresis, ear pain, neck pain, shortness of breath, sneezing, sore throat or swollen glands.        The following portions of the patient's history were reviewed and updated as appropriate: allergies, current medications, past family history, past medical history, past social history, past surgical history and problem list.    Review of Systems   Constitutional: Negative for chills and diaphoresis.   HENT: Positive for congestion, hoarse voice and sinus pressure. Negative for ear pain, sneezing and sore throat.    Respiratory: Positive for cough. Negative for shortness of breath.    Musculoskeletal: Negative for neck pain.   Neurological: Positive for headaches.       Objective   Physical Exam   Constitutional: She appears well-developed and well-nourished.   HENT:   Head: Normocephalic and atraumatic.   Right Ear: Tympanic membrane and ear canal normal.   Left Ear: Tympanic membrane and ear canal normal.   Nose: Mucosal edema and rhinorrhea present. Right sinus exhibits maxillary sinus tenderness. Right sinus exhibits no frontal sinus tenderness. Left sinus exhibits maxillary sinus tenderness. Left " sinus exhibits no frontal sinus tenderness.   Mouth/Throat: Uvula is midline and mucous membranes are normal.   Cardiovascular: Regular rhythm and normal heart sounds.   Pulmonary/Chest: Effort normal. She has no wheezes. She has no rhonchi. She has no rales.   Lymphadenopathy:     She has no cervical adenopathy.   Skin: Skin is warm and dry.       Assessment/Plan   Molly was seen today for sinusitis.    Diagnoses and all orders for this visit:    Acute recurrent maxillary sinusitis    Other orders  -     azithromycin (ZITHROMAX Z-ROMAINE) 250 MG tablet; Take 2 tablets the first day, then 1 tablet daily for 4 days.  -     brompheniramine-pseudoephedrine-DM 30-2-10 MG/5ML syrup; Take 5 mL by mouth 4 (Four) Times a Day As Needed for Congestion or Cough for up to 5 days.  -     fluticasone (FLONASE) 50 MCG/ACT nasal spray; 2 sprays into the nostril(s) as directed by provider Daily for 30 days.

## 2019-11-09 ENCOUNTER — IMMUNIZATION (OUTPATIENT)
Dept: RETAIL CLINIC | Facility: CLINIC | Age: 29
End: 2019-11-09

## 2019-11-09 DIAGNOSIS — Z23 NEED FOR VACCINATION: Primary | ICD-10-CM

## 2019-11-09 PROCEDURE — 90674 CCIIV4 VAC NO PRSV 0.5 ML IM: CPT | Performed by: NURSE PRACTITIONER

## 2019-11-09 PROCEDURE — 90471 IMMUNIZATION ADMIN: CPT | Performed by: NURSE PRACTITIONER

## 2020-01-09 ENCOUNTER — OFFICE VISIT (OUTPATIENT)
Dept: RETAIL CLINIC | Facility: CLINIC | Age: 30
End: 2020-01-09

## 2020-01-09 VITALS
WEIGHT: 219.6 LBS | OXYGEN SATURATION: 98 % | SYSTOLIC BLOOD PRESSURE: 114 MMHG | TEMPERATURE: 98.8 F | DIASTOLIC BLOOD PRESSURE: 70 MMHG | HEART RATE: 98 BPM | RESPIRATION RATE: 15 BRPM | HEIGHT: 64 IN | BODY MASS INDEX: 37.49 KG/M2

## 2020-01-09 DIAGNOSIS — R05.9 COUGHING: ICD-10-CM

## 2020-01-09 DIAGNOSIS — H65.03 BILATERAL ACUTE SEROUS OTITIS MEDIA, RECURRENCE NOT SPECIFIED: Primary | ICD-10-CM

## 2020-01-09 PROCEDURE — 99213 OFFICE O/P EST LOW 20 MIN: CPT | Performed by: NURSE PRACTITIONER

## 2020-01-09 RX ORDER — PSEUDOEPHEDRINE HCL 120 MG/1
120 TABLET, FILM COATED, EXTENDED RELEASE ORAL EVERY 12 HOURS
Qty: 20 TABLET | Refills: 0 | Status: SHIPPED | OUTPATIENT
Start: 2020-01-09 | End: 2020-01-19

## 2020-01-09 RX ORDER — PREDNISONE 10 MG/1
TABLET ORAL DAILY
Qty: 21 EACH | Refills: 0 | Status: SHIPPED | OUTPATIENT
Start: 2020-01-09 | End: 2020-01-15

## 2020-01-09 RX ORDER — DEXTROMETHORPHAN HYDROBROMIDE AND PROMETHAZINE HYDROCHLORIDE 15; 6.25 MG/5ML; MG/5ML
5 SYRUP ORAL 4 TIMES DAILY PRN
Qty: 240 ML | Refills: 0 | Status: SHIPPED | OUTPATIENT
Start: 2020-01-09 | End: 2020-01-19

## 2020-01-09 RX ORDER — MECLIZINE HYDROCHLORIDE 25 MG/1
25 TABLET ORAL DAILY PRN
COMMUNITY
Start: 2019-12-13

## 2020-01-09 RX ORDER — FLUTICASONE PROPIONATE 50 MCG
2 SPRAY, SUSPENSION (ML) NASAL NIGHTLY
Qty: 1 BOTTLE | Refills: 6 | Status: SHIPPED | OUTPATIENT
Start: 2020-01-09 | End: 2020-02-08

## 2020-01-09 RX ORDER — FEXOFENADINE HCL 180 MG/1
180 TABLET ORAL DAILY
COMMUNITY

## 2020-01-09 NOTE — PROGRESS NOTES
Subjective   Molly Blackwell is a 29 y.o. female.     Ear Fullness    There is pain in both ears. This is a new problem. The current episode started in the past 7 days. The problem occurs constantly. The problem has been gradually worsening. There has been no fever. The patient is experiencing no pain. Associated symptoms include coughing (severe, persistent), headaches, hearing loss (left ear, muffled) and rhinorrhea. Pertinent negatives include no abdominal pain, diarrhea, ear discharge, neck pain, rash, sore throat or vomiting. Treatments tried: allergy medication. The treatment provided no relief. There is no history of a chronic ear infection, hearing loss or a tympanostomy tube.   Cough   This is a new problem. The current episode started in the past 7 days. The problem has been unchanged. The problem occurs every few minutes. The cough is non-productive. Associated symptoms include ear congestion, headaches, postnasal drip and rhinorrhea. Pertinent negatives include no chest pain, chills, ear pain (ear fullness, left > right, no pain), fever, myalgias, nasal congestion, rash, sore throat, shortness of breath, sweats, weight loss or wheezing. She has tried OTC cough suppressant for the symptoms. The treatment provided no relief. There is no history of asthma, bronchitis or pneumonia.        The following portions of the patient's history were reviewed and updated as appropriate: allergies, current medications, past medical history, past social history, past surgical history and problem list.    Review of Systems   Constitutional: Negative for appetite change, chills, fever and weight loss.   HENT: Positive for congestion, hearing loss (left ear, muffled), postnasal drip, rhinorrhea and sinus pressure. Negative for ear discharge, ear pain (ear fullness, left > right, no pain), sinus pain, sneezing, sore throat and trouble swallowing.    Eyes: Negative.    Respiratory: Positive for cough (severe, persistent)  "and chest tightness. Negative for shortness of breath and wheezing.    Cardiovascular: Negative.  Negative for chest pain.   Gastrointestinal: Negative for abdominal pain, diarrhea, nausea and vomiting.   Musculoskeletal: Negative.  Negative for myalgias and neck pain.   Skin: Negative.  Negative for rash.   Neurological: Positive for headaches. Negative for dizziness.   Hematological: Positive for adenopathy.        /70   Pulse 98   Temp 98.8 °F (37.1 °C)   Resp 15   Ht 162.6 cm (64\")   Wt 99.6 kg (219 lb 9.6 oz)   LMP 12/20/2019   SpO2 98%   BMI 37.69 kg/m²      Objective   Physical Exam   Constitutional: She is oriented to person, place, and time. She appears well-developed and well-nourished. No distress.   HENT:   Head: Normocephalic.   Right Ear: External ear and ear canal normal. No drainage, swelling or tenderness. Tympanic membrane is bulging. Tympanic membrane is not erythematous. A middle ear effusion is present.   Left Ear: External ear and ear canal normal. No drainage, swelling or tenderness. Tympanic membrane is bulging. Tympanic membrane is not erythematous. A middle ear effusion is present.   Nose: Mucosal edema and rhinorrhea present. Right sinus exhibits no maxillary sinus tenderness and no frontal sinus tenderness. Left sinus exhibits no maxillary sinus tenderness and no frontal sinus tenderness.   Mouth/Throat: Uvula is midline, oropharynx is clear and moist and mucous membranes are normal. Tonsils are 0 on the right. Tonsils are 0 on the left. No tonsillar exudate.   Neck: Normal range of motion. Neck supple.   Cardiovascular: Regular rhythm, S1 normal, S2 normal and normal heart sounds. Tachycardia present.   Pulmonary/Chest: Effort normal and breath sounds normal. No stridor. No respiratory distress. She has no decreased breath sounds. She has no wheezes. She has no rhonchi. She has no rales.   Abdominal: Soft. Normal appearance and bowel sounds are normal. She exhibits no " distension. There is no tenderness. There is no rebound and no guarding.   Lymphadenopathy:        Head (right side): No tonsillar adenopathy present.        Head (left side): No tonsillar adenopathy present.     She has no cervical adenopathy.   Neurological: She is alert and oriented to person, place, and time.   Skin: Skin is warm and dry. No rash noted. She is not diaphoretic.   Psychiatric: She has a normal mood and affect. Her speech is normal and behavior is normal. Thought content normal.   Vitals reviewed.      Assessment/Plan   Molly was seen today for sinus problem and cough.    Diagnoses and all orders for this visit:    Bilateral acute serous otitis media, recurrence not specified  -     pseudoephedrine (SUDAFED) 120 MG 12 hr tablet; Take 1 tablet by mouth Every 12 (Twelve) Hours for 10 days.  -     predniSONE (DELTASONE) 10 MG (21) tablet pack; Take  by mouth Daily for 6 days. Use as directed on package  -     fluticasone (FLONASE) 50 MCG/ACT nasal spray; 2 sprays into the nostril(s) as directed by provider Every Night for 30 days. Administer 2 sprays in each nostril for each dose.    Coughing  -     predniSONE (DELTASONE) 10 MG (21) tablet pack; Take  by mouth Daily for 6 days. Use as directed on package  -     promethazine-dextromethorphan (PROMETHAZINE-DM) 6.25-15 MG/5ML syrup; Take 5 mL by mouth 4 (Four) Times a Day As Needed for Cough for up to 10 days.